# Patient Record
Sex: FEMALE | Race: WHITE | Employment: OTHER | URBAN - METROPOLITAN AREA
[De-identification: names, ages, dates, MRNs, and addresses within clinical notes are randomized per-mention and may not be internally consistent; named-entity substitution may affect disease eponyms.]

---

## 2021-08-03 ENCOUNTER — OFFICE VISIT (OUTPATIENT)
Dept: PULMONOLOGY | Age: 81
End: 2021-08-03
Payer: MEDICARE

## 2021-08-03 VITALS
BODY MASS INDEX: 48.01 KG/M2 | HEART RATE: 58 BPM | OXYGEN SATURATION: 97 % | TEMPERATURE: 98 F | RESPIRATION RATE: 16 BRPM | SYSTOLIC BLOOD PRESSURE: 130 MMHG | HEIGHT: 63 IN | DIASTOLIC BLOOD PRESSURE: 77 MMHG

## 2021-08-03 DIAGNOSIS — J44.9 CHRONIC OBSTRUCTIVE PULMONARY DISEASE, UNSPECIFIED COPD TYPE (HCC): ICD-10-CM

## 2021-08-03 DIAGNOSIS — J96.12 CHRONIC RESPIRATORY FAILURE WITH HYPERCAPNIA (HCC): Primary | ICD-10-CM

## 2021-08-03 DIAGNOSIS — Z99.89 DEPENDENCE ON ENABLING MACHINE: ICD-10-CM

## 2021-08-03 DIAGNOSIS — I50.32 DIASTOLIC CHF, CHRONIC (HCC): ICD-10-CM

## 2021-08-03 PROCEDURE — 99215 OFFICE O/P EST HI 40 MIN: CPT | Performed by: INTERNAL MEDICINE

## 2021-08-03 RX ORDER — OMEPRAZOLE 20 MG/1
20 CAPSULE, DELAYED RELEASE ORAL DAILY
COMMUNITY

## 2021-08-03 RX ORDER — TORSEMIDE 20 MG/1
20 TABLET ORAL DAILY
COMMUNITY

## 2021-08-03 RX ORDER — CHOLECALCIFEROL (VITAMIN D3) 1250 MCG
CAPSULE ORAL
COMMUNITY

## 2021-08-03 RX ORDER — LOPERAMIDE HYDROCHLORIDE 2 MG/1
2 CAPSULE ORAL 4 TIMES DAILY PRN
COMMUNITY

## 2021-08-03 RX ORDER — LANOLIN ALCOHOL/MO/W.PET/CERES
3 CREAM (GRAM) TOPICAL DAILY
COMMUNITY

## 2021-08-03 RX ORDER — FLUTICASONE PROPIONATE 110 UG/1
1 AEROSOL, METERED RESPIRATORY (INHALATION) 2 TIMES DAILY
COMMUNITY

## 2021-08-03 RX ORDER — SPIRONOLACTONE 25 MG/1
25 TABLET ORAL DAILY
COMMUNITY

## 2021-08-03 RX ORDER — BISACODYL 10 MG
10 SUPPOSITORY, RECTAL RECTAL DAILY
COMMUNITY

## 2021-08-03 RX ORDER — ROPINIROLE 0.5 MG/1
0.5 TABLET, FILM COATED ORAL NIGHTLY
COMMUNITY

## 2021-08-03 RX ORDER — DOCUSATE SODIUM 100 MG/1
100 CAPSULE, LIQUID FILLED ORAL DAILY
COMMUNITY

## 2021-08-03 RX ORDER — ONDANSETRON 4 MG/1
4 TABLET, FILM COATED ORAL EVERY 8 HOURS PRN
COMMUNITY

## 2021-08-03 RX ORDER — CYCLOSPORINE 0.5 MG/ML
1 EMULSION OPHTHALMIC 2 TIMES DAILY
COMMUNITY

## 2021-08-03 RX ORDER — GUAIFENESIN/DEXTROMETHORPHAN 100-10MG/5
5 SYRUP ORAL 3 TIMES DAILY PRN
COMMUNITY

## 2021-08-03 RX ORDER — ALBUTEROL SULFATE 90 UG/1
2 AEROSOL, METERED RESPIRATORY (INHALATION) EVERY 6 HOURS PRN
COMMUNITY

## 2021-08-03 ASSESSMENT — ENCOUNTER SYMPTOMS
EYES NEGATIVE: 1
RESPIRATORY NEGATIVE: 1
ALLERGIC/IMMUNOLOGIC NEGATIVE: 1
GASTROINTESTINAL NEGATIVE: 1

## 2021-08-03 NOTE — LETTER
Yes     PO2 90 - 110 mm HG 114High     BICARBONATE 21 - 29 mmol/L 34.3High     BASE EXCESS 0 - 2 mmol/L 3High     O2 SAT 92 - 96 % 97High   Comment: Tested at [address]            ABG done with BiPAP ST      Ref Range & Units 7d ago     PH 7.35 - 7.45 7. 371     PCO2 34 - 46 mm HG 53.4High       PO2 90 - 110 mm HG 99     BICARBONATE 21 - 29 mmol/L 30.2High       BASE EXCESS 0 - 2 mmol/L 4High       O2 SAT 92 - 96 % 97High     Comment: Tested at [address] [address]         ABG after AVAPS started      Ref Range & Units 6d ago     PH 7.35 - 7.45 7.445     PCO2 34 - 46 mm HG 51.6High       PO2 90 - 110 mm HG 90     BICARBONATE 21 - 29 mmol/L 34.7High       BASE EXCESS 0 - 2 mmol/L 9High       O2 SAT 92 - 96 % 97High     Comment: Tested at [address] [employer] [address] [address]     ORDER COMMENT   AVAPS 40% 97%                          Now at [address] [place] [address] is [facility]   Trilogy    AVAPS ST mode  avaps rate 1, tv of 500, ipap max 22 and min of 10, epap 8, rate 20, ti 1.5, rise 5, trig autotrak senstive,     Using 87.6 h/night  mv is 3.1 lpm  Pt trig 14%  Peak flow 11.9  Ave ipap 29.3  Ave epap of 8  tv of 153  Leak at 111.2    Using nasal mask  Patient is instructed to contact me if the ear popping gets worse    Will send order for supplies to [facility]      Will change the tidal volume to 550  Change epap min to 7 and max of 11  And get download for 1-2 weeks  Call me in 2 weeks about pressure change    Will ask blood work from the [facility] [place]  And see about the renal panel      RTC in 3 months                     Thank you for allowing me to assist in the care of the Doretha Ro MD

## 2021-08-03 NOTE — PROGRESS NOTES
Leslye Huang (:  1940) is a 79 y.o. female,Established patient, here for evaluation of the following chief complaint(s):  Follow-up (lungs former [facility] patient)         ASSESSMENT/PLAN:  1. Chronic respiratory failure with hypercapnia (HCC)  2. Dependence on enabling machine  3. Chronic obstructive pulmonary disease, unspecified COPD type ([address]  4. Diastolic CHF, chronic (HCC)        Patient will need noninvasive mechanical ventilation  She does not have any sleep apnea  PFTs remarkably are more consistent with an obstructive event some possibility of restrictive lung defect however will probably need a full PFT to determine this     Patient did not tolerate BiPAP and during the hospitalization and was worsened with increased CO2 level, also had BiPAP with ST and still had increased CO2 level     Ordering volume ventilator for nocturnal uses and daytime use as needed to reduce the risk of exacerbation.  Bipap insufficient due to severity of condition.   Absence of respiratory support will lead to death.        Patient has COPD and Chronic Respiratory Failure.  Patient will need targeted tidal volume which cannot be provided via a CPAP.  Bipap/Bilevel will not adequately ventilate this patient in order to complete ADLs,   Bipap/Bilevel will not lower the patient's CO2 levels, and this patient will require a set tidal volume.  Therefore Astral/Trilogy NIV is being ordered due to the severe state of this patient's disease. Without this therapy, the patient runs a higher risk of expiration.        ABG done after being on bipap for 1 month and coming to the hospital       7.35 - 7.45 7.197Low Panic     Comment: *CRITICAL VALUE*   Successful Call: PCO2,PH called 2020 10:14 AM to [facility] ([id number]/CONCHIS HALL) by [phone]. Read Back:  Yes     PCO2 34 - 46 mm HG 90.4High Panic   Comment: *CRITICAL VALUE*   Successful Call: PCO2,PH called 2020 10:14 AM to [facility] ([id number]/CONCHIS HALL) by [phone]. Read Back: Yes     PO2 90 - 110 mm HG 114High     BICARBONATE 21 - 29 mmol/L 34.3High     BASE EXCESS 0 - 2 mmol/L 3High     O2 SAT 92 - 96 % 97High   Comment: Tested at [address] [facility] [address]            ABG done with BiPAP ST      Ref Range & Units 7d ago     PH 7.35 - 7.45 7. 371     PCO2 34 - 46 mm HG 53.4High       PO2 90 - 110 mm HG 99     BICARBONATE 21 - 29 mmol/L 30.2High       BASE EXCESS 0 - 2 mmol/L 4High       O2 SAT 92 - 96 % 97High     Comment: Tested at [address] [employer] [address] [address]         ABG after AVAPS started      Ref Range & Units 6d ago     PH 7.35 - 7.45 7.445     PCO2 34 - 46 mm HG 51.6High       PO2 90 - 110 mm HG 90     BICARBONATE 21 - 29 mmol/L 34.7High       BASE EXCESS 0 - 2 mmol/L 9High       O2 SAT 92 - 96 % 97High     Comment: Tested at [address] [address]     ORDER COMMENT   AVAPS 40% 97%                          Now at [address] [place] [address] is [facility]   Trilogy    AVAPS ST mode  avaps rate 1, tv of 500, ipap max 22 and min of 10, epap 8, rate 20, ti 1.5, rise 5, trig autotrak senstive,     Using 87.6 h/night  mv is 3.1 lpm  Pt trig 14%  Peak flow 11.9  Ave ipap 29.3  Ave epap of 8  tv of 153  Leak at 111.2    Using nasal mask  Patient is instructed to contact me if the ear popping gets worse    Will send order for supplies to [facility]      Will change the tidal volume to 550  Change epap min to 7 and max of 11  And get download for 1-2 weeks  Call me in 2 weeks about pressure change    Will ask blood work from the [facility]  And see about the renal panel      RTC in 3 months      No follow-ups on file.            [address] [facility]  [address] [place] [address]  Dept: [phone]  Loc: [phone]     Diagnosis:  [] ZULLY (ICD-10: G47.33)  o CSA (ICD-10: G47.31)  [] Complex Sleep Apnea (ICD-10: G47. 40)  []  (ICD-10: G47.37)  [] Hypoxemia (ICD-10: R09.02)  [] COPD (J44.90)  [] Chronic Respiratory Failure with hypoxemia (J96.11)  [x] Chronicr Respiratory Failure with hypercapnia (J96.12)  [] Restrictive Lung Disease (J98.4)      [] New Rx (Device Preference: _________________________)     [x] Change Order  avaps AE     [] Replace ___________    [] Discontinue Order -  [] CPAP    [] BPAP    [] PAP    [] Oxygen   /   AMA?  [] Yes   [] No              Therapy    AHI: ________ BRIAN: ________  LOW SpO2: ________%      DME: [facility]    DEVICE / SETTINGS RAMP / COMFORT INTERFACE   []  CPAP () Pressure    Ramp: _________ min's  [] Ramp to patient preference General PAP Supply Kit  Medicaid does not cover heated tubing  ([employer])  PAP Tubing:  [] Heated ()- 1/3 mos                         [] Regular () - 1/3 mos  [] Disposable Water Chamber () - 1/6 mos  [] Disposable Filter () - 2/mo  [] Non-Disposable Filter () - 1/6 mos   []  BiLevel PAP ()           IPAP        []  BiLevel PAP with   ()       Backup Rate ()      EPAP Rate  [] Adjust FLEX to patient comfort       SUPPLEMENTAL OXYGEN  [] OXYGEN:      Liter/min: _________  [] Continuous        [] Nocturnal  [] Bleed into PAP Device      []  [facility] Kit    [] Mask interface () - 1/3 mos  [] Nasal Cushion () - 2/mo  [] Nasal Pillows ()  -2/mo  [] Headgear () - 1/6 mos   []  AutoBiLevel () Pressure  ()      Support           []  ResMed® IVAPS EPAP  [] Overnight Oximetry on Room Air  [] Overnight Oximetry on PAP Therapy    Target Pt Rate  Min PS      Target Va  Patient Ht  Ti Max                Ti Min        Rise time  Max PS  Trigger  Cycle  Epap  Epap max  Epap min  The patient has a history of:  [] Excessive Daytime Sleepiness  [] Insomnia  [] Impaired Cognition  [] Ischemic Heart Disease  [] Hypertension  [] Mood Disorders          [] History of Stroke  Additional Orders:______________________________________________________________________________________________________________________________________________________________________________     Full Face Mask Kit    [] Full face mask () - 1/3 mos  [] Full Face Cushion () - 1/mo  [] Headgear () - 1/6 mos                                           [] Respironics® ASV Advanced ()     EPAP Min  PS Min      EPAP Max  PS Max   Additional Supplies    [] Chin Strap ()  [] Heated Humidifier Kit ()  Mask Specifications:   [] Patient Preference      -or-            Brand:______________ Size:_______________   Type: __________________________________   [] Mask Refit:___________________________                                           Max Press  Ramp Time      Rate  Bi-flex: []1  []2  []3     [x] Respironics® AVAPS: ()     IPAP Min  IPAP Max  Pressure Max  Epap max  Epap min  Rise time 11        7             Avaps rate  EPAP   Additional Services    [] Annual PRN service and check of equipment  [] Routine service and check of equipment  [x] Download and report compliance data  2 weeks    Tidal volume 550     Tigger                                     Rate                                         Inspiratory time                                                         The following equipment is Medically Necessary for the above stated patient. It is reasonable and necessary in reference to acceptable standards of medical practice for this condition, and is not prescribed as a convenience. Frequency of Use:    Daily                 Length of Need: 13 Months              o The patient requires BiLevel PAP and the following apply: []  The patient requires a Respiratory Assist Device (RAD) and the following apply:   o CPAP was tried and failed to meet therapeutic goals.  [] CPAP was tried, but failed to meet therapeutic goals   o The prescribed mask interface has been properly fit, is the most comfortable to the patient and will be used with the BPAP device. [] The prescribed mask interface has been properly fit, is the most comfortable to the patient and will be used with the RAD.   o Current CPAP setting prevents patient from tolerating the therapy and lower CPAP settings fail to adequately control the symptoms of ZULLY, improve sleep quality, or reduce AHI to acceptable levels. [] Current CPAP setting prevent patient from tolerating the therapy and lower PAP settings fail to  adequately control ZULLY symptoms, improve sleep quality, or reduce AHI to acceptable levels.          [] There is significant improvement of the respiratory events on the RAD                                                                                                                                                                                                                                  Quang Mueller MD NPI- [id number]     [place]KA- [id number]                    08/03/21       ____________________________                        _______________________           Physician Signature                                                         Date                                                     Subjective   SUBJECTIVE/OBJECTIVE:  Pt was initially see by Dr. Angelo De Jesus in the hospital in 9/2020  But was placed on bipap    Reason for Consult: Cynthia Jeronimo is being seen for acute respiratory failure with hypoxia and hypercarbia, morbid obesity     History of Present Illness: Ms. Jeremias Hector is a 79year old  female with a medical history significant for former tobacco abuse for which she quit smoking in 1971, morbid obesity, and obstructive sleep apnea for which she uses BiPAP who initially presented to the hospital with complaints of increasing shortness of breath associated with productive cough with brown sputum for the past month as well as increasing edema in her lower extremities. She was also noted to have bradycardia with atrial fibrillation. Venous blood gas was obtained in the emergency department noted to be 64. Yesterday evening, a stroke alert alert was called as she was having decreased level of interaction and bilateral arm weakness. ABG was ordered at that time, however it was not completed until this morning. ABG this morning was noted to have a CO2 of 90 with a pH of 7.19. She was placed on BiPAP. BiPAP settings were changed and ABG was re-collected demonstrating a pH of 7.20 with a CO2 of 97. She is emergently being placed on AVAPS. She is opening her eyes, but moaning and unable to follow commands. Her sister is at bedside. Her sister reports that she has been wearing her BiPAP more at the facility as she has been more short of breath. Initial chest x-ray demonstrated left basilar and perihilar consolidation with air space disease. BNP was elevated at 183. Pro-calcitonin was negative. UTI noted on urinalysis. She is on Flovent at the facility.      Her sister reports that she resides at [address] [place] [address]. The patient's sister is going to call the patient's son and clarify her CODE STATUS.          Patient will need noninvasive mechanical ventilation  She does not have any sleep apnea  PFTs remarkably are more consistent with an obstructive event some possibility of restrictive lung defect however will probably need a full PFT to determine this     Patient did not tolerate BiPAP and during the hospitalization and was worsened with increased CO2 level, also had BiPAP with ST and still had increased CO2 level     Ordering volume ventilator for nocturnal uses and daytime use as needed to reduce the risk of exacerbation.  Bipap insufficient due to severity of condition.   Absence of respiratory support will lead to death.        Patient has COPD and Chronic Respiratory Failure.  Patient will need targeted tidal volume which cannot be provided via a CPAP.  Bipap/Bilevel will not adequately ventilate this patient in order to complete ADLs,   Bipap/Bilevel will not lower the patient's CO2 levels, and this patient will require a set tidal volume.  Therefore Astral/Trilogy NIV is being ordered due to the severe state of this patient's disease. Without this therapy, the patient runs a higher risk of expiration.        ABG done after being on bipap for 1 month and coming to the hospital         7.35 - 7.45 7.197Low Panic    Comment: *CRITICAL VALUE*   Successful Call: PCO2,PH called 09/02/2020 10:14 AM to [employer] ([id number]/ISABEL,CONCHIS) by [phone]. Read Back: Yes     PCO2 34 - 46 mm HG 90.4High Panic   Comment: *CRITICAL VALUE*   Successful Call: PCO2,PH called 09/02/2020 10:14 AM to [employer] ([id number]/ISABEL,CONCHIS) by [phone]. Read Back: Yes     PO2 90 - 110 mm HG 114High     BICARBONATE 21 - 29 mmol/L 34.3High     BASE EXCESS 0 - 2 mmol/L 3High     O2 SAT 92 - 96 % 97High   Comment: Tested at [address]            ABG done with BiPAP ST        Ref Range & Units 7d ago    PH 7.35 - 7.45 7. 371     PCO2 34 - 46 mm HG 53.4High      PO2 90 - 110 mm HG 99     BICARBONATE 21 - 29 mmol/L 30.2High      BASE EXCESS 0 - 2 mmol/L 4High      O2 SAT 92 - 96 % 97High    Comment: Tested at [address] [address]        ABG after AVAPS started        Ref Range & Units 6d ago    PH 7.35 - 7.45 7.445     PCO2 34 - 46 mm HG 51.6High      PO2 90 - 110 mm HG 90     BICARBONATE 21 - 29 mmol/L 34.7High      BASE EXCESS 0 - 2 mmol/L 9High      O2 SAT 92 - 96 % 97High    Comment: Tested at [address] [address]    ORDER COMMENT   AVAPS 40% 97%                     Cally Crzu MD        Please note that some or all of this record was generated using voice recognition software.  If there are any questions about the content of this document, please contact the author as some errors of transcription may have occurred.       Patient is tolerating noninvasive mechanical ventilation  No issues    Occasionally having some ear popping but this is very rarely    No bloating  No burping  No chest pains  No headaches  Tolerating the pressure  No nausea or vomiting  No ear popping    Going and sleeping well with the machine          Some dry mouth    No ear popping  No chest pain    Feels good  Sleeping better    Having more leak  And feels like blowing everywhere    No c      Review of Systems   Constitutional: Negative. HENT: Negative. Eyes: Negative. Respiratory: Negative. Cardiovascular: Negative. Gastrointestinal: Negative. Endocrine: Negative. Genitourinary: Negative. Musculoskeletal: Negative. Skin: Negative. Allergic/Immunologic: Negative. Neurological: Negative. Hematological: Negative. Psychiatric/Behavioral: Negative. Objective   Physical Exam  Vitals and nursing note reviewed. Constitutional:       General: She is not in acute distress. Appearance: Normal appearance. She is not ill-appearing. HENT:      Head: Normocephalic and atraumatic. Right Ear: External ear normal.      Left Ear: External ear normal.      Nose: Nose normal.      Mouth/Throat:      Mouth: Mucous membranes are moist.      Pharynx: Oropharynx is clear. Comments: Mallampati 3  Eyes:      General: No scleral icterus. Extraocular Movements: Extraocular movements intact. Conjunctiva/sclera: Conjunctivae normal.      Pupils: Pupils are equal, round, and reactive to light. Cardiovascular:      Rate and Rhythm: Normal rate and regular rhythm. Pulses: Normal pulses. Heart sounds: Normal heart sounds. No murmur heard. No friction rub. Pulmonary:      Effort: No respiratory distress. Breath sounds: No stridor. No wheezing, rhonchi or rales. Chest:      Chest wall: No tenderness. Abdominal:      General: Abdomen is flat.  Bowel sounds are normal. There is no distension. Tenderness: There is no abdominal tenderness. There is no guarding. Musculoskeletal:         General: Swelling present. No tenderness. Normal range of motion. Cervical back: Normal range of motion and neck supple. No rigidity. Skin:     General: Skin is warm and dry. Coloration: Skin is not jaundiced. Neurological:      General: No focal deficit present. Mental Status: She is alert and oriented to person, place, and time. Mental status is at baseline. Cranial Nerves: No cranial nerve deficit. Sensory: No sensory deficit. Motor: No weakness. Gait: Gait normal.   Psychiatric:         Mood and Affect: Mood normal.         Thought Content: Thought content normal.         Judgment: Judgment normal.            On this date 8/3/2021 I have spent 35 minutes reviewing previous notes, test results and face to face with the patient discussing the diagnosis and importance of compliance with the treatment plan as well as documenting on the day of the visit.       An electronic signature was used to authenticate this note.    --Bev Mckeon MD

## 2021-08-03 NOTE — PATIENT INSTRUCTIONS
ASSESSMENT/PLAN:  1. Chronic respiratory failure with hypercapnia (HCC)  2. Dependence on enabling machine  3. Chronic obstructive pulmonary disease, unspecified COPD type ([address]  4. Diastolic CHF, chronic (HCC)        Patient will need noninvasive mechanical ventilation  She does not have any sleep apnea  PFTs remarkably are more consistent with an obstructive event some possibility of restrictive lung defect however will probably need a full PFT to determine this     Patient did not tolerate BiPAP and during the hospitalization and was worsened with increased CO2 level, also had BiPAP with ST and still had increased CO2 level     Ordering volume ventilator for nocturnal uses and daytime use as needed to reduce the risk of exacerbation.  Bipap insufficient due to severity of condition.   Absence of respiratory support will lead to death.        Patient has COPD and Chronic Respiratory Failure.  Patient will need targeted tidal volume which cannot be provided via a CPAP.  Bipap/Bilevel will not adequately ventilate this patient in order to complete ADLs,   Bipap/Bilevel will not lower the patient's CO2 levels, and this patient will require a set tidal volume.  Therefore Astral/Trilogy NIV is being ordered due to the severe state of this patient's disease. Without this therapy, the patient runs a higher risk of expiration.        ABG done after being on bipap for 1 month and coming to the hospital       7.35 - 7.45 7.197Low Panic     Comment: *CRITICAL VALUE*   Successful Call: PCO2,PH called 09/02/2020 10:14 AM to [employer] ([employer]/ISABEL,CONCHIS) by [phone]. Read Back: Yes     PCO2 34 - 46 mm HG 90.4High Panic   Comment: *CRITICAL VALUE*   Successful Call: PCO2,PH called 09/02/2020 10:14 AM to [employer] ([id number]/ISABEL,CONCHIS) by [phone]. Read Back:  Yes     PO2 90 - 110 mm HG 114High     BICARBONATE 21 - 29 mmol/L 34.3High     BASE EXCESS 0 - 2 mmol/L 3High     O2 SAT 92 - 96 % 97High   Comment: [facility] [employer] [address]            ABG done with BiPAP ST      Ref Range & Units 7d ago     PH 7.35 - 7.45 7. 371     PCO2 34 - 46 mm HG 53.4High       PO2 90 - 110 mm HG 99     BICARBONATE 21 - 29 mmol/L 30.2High       BASE EXCESS 0 - 2 mmol/L 4High       O2 SAT 92 - 96 % 97High     Comment: Tested at [address] [facility] [address]         ABG after AVAPS started      Ref Range & Units 6d ago     PH 7.35 - 7.45 7.445     PCO2 34 - 46 mm HG 51.6High       PO2 90 - 110 mm HG 90     BICARBONATE 21 - 29 mmol/L 34.7High       BASE EXCESS 0 - 2 mmol/L 9High       O2 SAT 92 - 96 % 97High     Comment: Tested at [address] [place] [id number]     ORDER COMMENT   AVAPS 40% 97%                          Now at [facility] is [facility]   Trilogy    AVAPS ST mode  avaps rate 1, tv of 500, ipap max 22 and min of 10, epap 8, rate 20, ti 1.5, rise 5, trig autotrak senstive,     Using 87.6 h/night  mv is 3.1 lpm  Pt trig 14%  Peak flow 11.9  Ave ipap 29.3  Ave epap of 8  tv of 153  Leak at 111.2    Using nasal mask  Patient is instructed to contact me if the ear popping gets worse    Will send order for supplies to [facility]      Will change the tidal volume to 550  Change epap min to 7 and max of 11  And get download for 1-2 weeks  Call me in 2 weeks about pressure change    Will ask blood work from the [facility]  And see about the renal panel      RTC in 3 months    Remember to bring a list of pulmonary medications and any CPAP or BiPAP machines to your next appointment with the office. Please keep all of your future appointments scheduled by [address], [facility]. Out of respect for other patients and providers, you may be asked to reschedule your appointment if you arrive later than your scheduled appointment time. Appointments cancelled less than 24hrs in advance will be considered a no show.  Patients with three missed appointments within 1 year or four missed appointments within 2 years can be dismissed from the practice. Please be aware that our physicians are required to work in the Intensive Care Unit at [facility].  Your appointment may need to be rescheduled if they are designated to work during your appointment time. You may receive a survey regarding the care you received during your visit. Your input is valuable to us. We encourage you to complete and return your survey. We hope you will choose us in the future for your healthcare needs. Pt instructed of all future appointment dates & times, including radiology, labs, procedures & referrals. If procedures were scheduled preparation instructions provided. Instructions on future appointments with [facility] were given.

## 2021-08-03 NOTE — PROGRESS NOTES
MA Communication:   The following orders are received by verbal communication from Aracelis Davidson MD    Orders include:  Orders faxed to [facility]       3 mo fu scheduled 11/2/21

## 2021-11-02 ENCOUNTER — OFFICE VISIT (OUTPATIENT)
Dept: PULMONOLOGY | Age: 81
End: 2021-11-02
Payer: MEDICARE

## 2021-11-02 VITALS
RESPIRATION RATE: 16 BRPM | BODY MASS INDEX: 51.91 KG/M2 | SYSTOLIC BLOOD PRESSURE: 130 MMHG | DIASTOLIC BLOOD PRESSURE: 69 MMHG | OXYGEN SATURATION: 93 % | WEIGHT: 293 LBS | HEART RATE: 54 BPM | TEMPERATURE: 97.1 F | HEIGHT: 63 IN

## 2021-11-02 DIAGNOSIS — J44.9 CHRONIC OBSTRUCTIVE PULMONARY DISEASE, UNSPECIFIED COPD TYPE (HCC): ICD-10-CM

## 2021-11-02 DIAGNOSIS — J32.9 SINUSITIS, UNSPECIFIED CHRONICITY, UNSPECIFIED LOCATION: ICD-10-CM

## 2021-11-02 DIAGNOSIS — Z99.89 DEPENDENCE ON ENABLING MACHINE: ICD-10-CM

## 2021-11-02 DIAGNOSIS — J96.12 CHRONIC RESPIRATORY FAILURE WITH HYPERCAPNIA (HCC): Primary | ICD-10-CM

## 2021-11-02 DIAGNOSIS — I50.32 DIASTOLIC CHF, CHRONIC (HCC): ICD-10-CM

## 2021-11-02 PROCEDURE — G8427 DOCREV CUR MEDS BY ELIG CLIN: HCPCS | Performed by: INTERNAL MEDICINE

## 2021-11-02 PROCEDURE — 1036F TOBACCO NON-USER: CPT | Performed by: INTERNAL MEDICINE

## 2021-11-02 PROCEDURE — 1090F PRES/ABSN URINE INCON ASSESS: CPT | Performed by: INTERNAL MEDICINE

## 2021-11-02 PROCEDURE — 4040F PNEUMOC VAC/ADMIN/RCVD: CPT | Performed by: INTERNAL MEDICINE

## 2021-11-02 PROCEDURE — 3023F SPIROM DOC REV: CPT | Performed by: INTERNAL MEDICINE

## 2021-11-02 PROCEDURE — G8419 CALC BMI OUT NRM PARAM NOF/U: HCPCS | Performed by: INTERNAL MEDICINE

## 2021-11-02 PROCEDURE — 99215 OFFICE O/P EST HI 40 MIN: CPT | Performed by: INTERNAL MEDICINE

## 2021-11-02 PROCEDURE — G8400 PT W/DXA NO RESULTS DOC: HCPCS | Performed by: INTERNAL MEDICINE

## 2021-11-02 PROCEDURE — G8926 SPIRO NO PERF OR DOC: HCPCS | Performed by: INTERNAL MEDICINE

## 2021-11-02 PROCEDURE — 1123F ACP DISCUSS/DSCN MKR DOCD: CPT | Performed by: INTERNAL MEDICINE

## 2021-11-02 PROCEDURE — G8484 FLU IMMUNIZE NO ADMIN: HCPCS | Performed by: INTERNAL MEDICINE

## 2021-11-02 ASSESSMENT — ENCOUNTER SYMPTOMS
GASTROINTESTINAL NEGATIVE: 1
ALLERGIC/IMMUNOLOGIC NEGATIVE: 1
EYES NEGATIVE: 1
RESPIRATORY NEGATIVE: 1

## 2021-11-02 ASSESSMENT — COPD QUESTIONNAIRES: COPD: 1

## 2021-11-02 NOTE — PROGRESS NOTES
MA Communication:   The following orders are received by verbal communication from Susan Valdez MD    Orders include:  Orders faxed to [facility]       3 mo fu scheduled 2/8/22

## 2021-11-02 NOTE — PROGRESS NOTES
Isa Ashley (:  1940) is a 79 y.o. female,Established patient, here for evaluation of the following chief complaint(s):  COPD         ASSESSMENT/PLAN:  1. Chronic respiratory failure with hypercapnia (HCC)  2. Dependence on enabling machine  3. Chronic obstructive pulmonary disease, unspecified COPD type ([address]  4. Diastolic CHF, chronic ([address]  5. Sinusitis, unspecified chronicity, unspecified location        Patient will need noninvasive mechanical ventilation  She does not have any sleep apnea  PFTs remarkably are more consistent with an obstructive event some possibility of restrictive lung defect however will probably need a full PFT to determine this     Patient did not tolerate BiPAP and during the hospitalization and was worsened with increased CO2 level, also had BiPAP with ST and still had increased CO2 level     Ordering volume ventilator for nocturnal uses and daytime use as needed to reduce the risk of exacerbation.  Bipap insufficient due to severity of condition.   Absence of respiratory support will lead to death.        Patient has COPD and Chronic Respiratory Failure.  Patient will need targeted tidal volume which cannot be provided via a CPAP.  Bipap/Bilevel will not adequately ventilate this patient in order to complete ADLs,   Bipap/Bilevel will not lower the patient's CO2 levels, and this patient will require a set tidal volume.  Therefore Astral/Trilogy NIV is being ordered due to the severe state of this patient's disease. Without this therapy, the patient runs a higher risk of expiration.        ABG done after being on bipap for 1 month and coming to the hospital       7.35 - 7.45 7.197Low Panic     Comment: *CRITICAL VALUE*   Successful Call: PCO2,PH called 2020 10:14 AM to [employer] ([id number]/CONCHIS HALL) by [id number]. Read Back:  Yes     PCO2 34 - 46 mm HG 90.4High Panic   Comment: *CRITICAL VALUE*   Successful Call: PCO2,PH called 2020 10:14 AM to [employer] ([phone]) by [phone]. Read Back: Yes     PO2 90 - 110 mm HG 114High     BICARBONATE 21 - 29 mmol/L 34.3High     BASE EXCESS 0 - 2 mmol/L 3High     O2 SAT 92 - 96 % 97High   Comment: Tested at [facility] [address]            ABG done with BiPAP ST      Ref Range & Units 7d ago     PH 7.35 - 7.45 7. 371     PCO2 34 - 46 mm HG 53.4High       PO2 90 - 110 mm HG 99     BICARBONATE 21 - 29 mmol/L 30.2High       BASE EXCESS 0 - 2 mmol/L 4High       O2 SAT 92 - 96 % 97High     Comment: Tested at [facility] [address] [id number]         ABG after AVAPS started      Ref Range & Units 6d ago     PH 7.35 - 7.45 7.445     PCO2 34 - 46 mm HG 51.6High       PO2 90 - 110 mm HG 90     BICARBONATE 21 - 29 mmol/L 34.7High       BASE EXCESS 0 - 2 mmol/L 9High       O2 SAT 92 - 96 % 97High     Comment: Tested at [facility] [address] [id number]     ORDER COMMENT   AVAPS 40% 97%            Now at [facility]      dme is [facility]   Trilogy    AVAPS ST mode  avaps rate 1, tv of 550, ipap max 22 and min of 10, epap min 7 and max of 11, rate 20, ti 1.5, rise 5, trig autotrak senstive,     Download done 10/27/2021  Average IPAP 30  Average EPAP 10.5  Average breaths per minute 20  Average tidal volume 228  Average minute ventilation 4.5  100% use  Using 8.9 hours per night  Leak at 94 L/min      Using nasal mask  Patient is instructed to contact me if the ear popping gets worse    Will send order for supplies to [facility]        Will ask blood work from the [facility]- still not seen  And see about the renal panel      Sinusitis  Would suggest  flonase 1spray/nostril twice a day      RTC in 3 months      No follow-ups on file.            [address] [facility]  [address]  [facility] [address]  Dept: [phone]  Loc: [phone]     Diagnosis:  [] ZULLY (ICD-10: G47.33)  o CSA (ICD-10: G47.31)  [] Complex Sleep Apnea (ICD-10: G47.37)  []  (ICD-10: G47.37)  [] Hypoxemia (ICD-10: R09.02)  [] COPD (J44.90)  [] Chronic Respiratory Failure with hypoxemia (J96.11)  [x] Chronicr Respiratory Failure with hypercapnia (J96.12)  [] Restrictive Lung Disease (J98.4)      [] New Rx (Device Preference: _________________________)     [x] Change Order  avaps AE, will need to change      [] Replace ___________    [] Discontinue Order -  [] CPAP    [] BPAP    [] PAP    [] Oxygen   /   AMA?  [] Yes   [] No              Therapy    AHI: ________ BRIAN: ________  LOW SpO2: ________%      DME: [facility]    DEVICE / SETTINGS RAMP / COMFORT INTERFACE   []  CPAP () Pressure    Ramp: _________ min's  [] Ramp to patient preference General PAP Supply Kit  Medicaid does not cover heated tubing  ([employer])  PAP Tubing:  [] Heated ()- 1/3 mos                         [] Regular () - 1/3 mos  [x] Disposable Water Chamber () - 1/6 mos  [x] Disposable Filter () - 2/mo  [x] Non-Disposable Filter () - 1/6 mos   []  BiLevel PAP ()           IPAP        []  BiLevel PAP with   ()       Backup Rate ()      EPAP Rate  [] Adjust FLEX to patient comfort       SUPPLEMENTAL OXYGEN  [] OXYGEN:      Liter/min: _________  [] Continuous        [] Nocturnal  [] Bleed into PAP Device      []  [address] Kit    [x] Mask interface () - 1/3 mos  [x] Nasal Cushion () - 2/mo  [x] Nasal Pillows ()  -2/mo  [x] Headgear () - 1/6 mos   []  AutoBiLevel () Pressure  ()      Support           []  ResMed® IVAPS EPAP  [] Overnight Oximetry on Room Air  [] Overnight Oximetry on PAP Therapy    Target Pt Rate  Min PS      Target Va  Patient Ht  Ti Max                Ti Min        Rise time  Max PS  Trigger  Cycle  Epap  Epap max  Epap min  The patient has a history of:  [] Excessive Daytime Sleepiness  [] Insomnia  [] Impaired Cognition  [] Ischemic Heart Disease  [] Hypertension  [] Mood Disorders          [] History of Stroke  Additional Orders:______________________________________will need download after 2 weeks of the change below______________________________________________________________________________________________________     Full Face Mask Kit    [] Full face mask () - 1/3 mos  [] Full Face Cushion () - 1/mo  [] Headgear () - 1/6 mos                                           [] Respironics® ASV Advanced ()     EPAP Min  PS Min      EPAP Max  PS Max   Additional Supplies    [] Chin Strap ()  [] Heated Humidifier Kit ()  Mask Specifications:   [] Patient Preference      -or-            Brand:______________ Size:_______________   Type: __________________________________   [] Mask Refit:___________________________                                           Max Press  Ramp Time      Rate  Bi-flex: []1  []2  []3     [x] Respironics® AVAPS: ()     IPAP Min  IPAP Max  Pressure Max  Epap max  Epap min  Rise time 12      30  9             Avaps rate  EPAP   Additional Services    [] Annual PRN service and check of equipment  [] Routine service and check of equipment  [x] Download and report compliance data  2 weeks    Tidal volume 550     Tigger                                     Rate                                         Inspiratory time                                                         The following equipment is Medically Necessary for the above stated patient. It is reasonable and necessary in reference to acceptable standards of medical practice for this condition, and is not prescribed as a convenience.            Frequency of Use:    Daily                 Length of Need: 13 Months              o The patient requires BiLevel PAP and the following apply: []  The patient requires a Respiratory Assist Device (RAD) and the following apply:   o CPAP was tried and failed to meet therapeutic goals. [] CPAP was tried, but failed to meet therapeutic goals   o The prescribed mask interface has been properly fit, is the most comfortable to the patient and will be used with the BPAP device. [] The prescribed mask interface has been properly fit, is the most comfortable to the patient and will be used with the RAD.   o Current CPAP setting prevents patient from tolerating the therapy and lower CPAP settings fail to adequately control the symptoms of ZULLY, improve sleep quality, or reduce AHI to acceptable levels. [] Current CPAP setting prevent patient from tolerating the therapy and lower PAP settings fail to  adequately control ZULLY symptoms, improve sleep quality, or reduce AHI to acceptable levels.          [] There is significant improvement of the respiratory events on the RAD                                                                                                                                                                                                                                  Bessie Montero MD               NPI- [id number]     KOKA- [phone]                    11/02/21       ____________________________                        _______________________           Physician Signature                                                         Date                                                     Subjective   SUBJECTIVE/OBJECTIVE:  Pt was initially see by Dr. Suzette Encarnacion in the hospital in 9/2020  But was placed on bipap    Reason for Consult: Tha Abdullahi is being seen for acute respiratory failure with hypoxia and hypercarbia, morbid obesity     History of Present Illness: Ms. Sanjay Hanley is a 79year old  female with a medical history significant for former tobacco abuse for which she quit smoking in 1971, morbid obesity, and obstructive sleep apnea for which she uses BiPAP who initially presented to the hospital with complaints of increasing shortness of breath associated with productive cough with brown sputum for the past month as well as increasing edema in her lower extremities. She was also noted to have bradycardia with atrial fibrillation. Venous blood gas was obtained in the emergency department noted to be 64. Yesterday evening, a stroke alert alert was called as she was having decreased level of interaction and bilateral arm weakness. ABG was ordered at that time, however it was not completed until this morning. ABG this morning was noted to have a CO2 of 90 with a pH of 7.19. She was placed on BiPAP. BiPAP settings were changed and ABG was re-collected demonstrating a pH of 7.20 with a CO2 of 97. She is emergently being placed on AVAPS. She is opening her eyes, but moaning and unable to follow commands. Her sister is at bedside. Her sister reports that she has been wearing her BiPAP more at the facility as she has been more short of breath. Initial chest x-ray demonstrated left basilar and perihilar consolidation with air space disease. BNP was elevated at 183. Pro-calcitonin was negative. UTI noted on urinalysis. She is on Flovent at the facility.      Her sister reports that she resides at [address] [place] [address].  The patient's sister is going to call the patient's son and clarify her CODE STATUS.          Patient will need noninvasive mechanical ventilation  She does not have any sleep apnea  PFTs remarkably are more consistent with an obstructive event some possibility of restrictive lung defect however will probably need a full PFT to determine this     Patient did not tolerate BiPAP and during the hospitalization and was worsened with increased CO2 level, also had BiPAP with ST and still had increased CO2 level     Ordering volume ventilator for nocturnal uses and daytime use as needed to reduce the risk of exacerbation.  Bipap insufficient due to severity of condition.   Absence of respiratory support will lead to death.        Patient has COPD and Chronic Respiratory Failure.  Patient will need targeted tidal volume which cannot be provided via a CPAP.  Bipap/Bilevel will not adequately ventilate this patient in order to complete ADLs,   Bipap/Bilevel will not lower the patient's CO2 levels, and this patient will require a set tidal volume.  Therefore Astral/Trilogy NIV is being ordered due to the severe state of this patient's disease. Without this therapy, the patient runs a higher risk of expiration.        ABG done after being on bipap for 1 month and coming to the hospital         7.35 - 7.45 7.197Low Panic    Comment: *CRITICAL VALUE*   Successful Call: PCO2,PH called 09/02/2020 10:14 AM to [employer] ([id number]/CONCHIS HALL) by [phone]. Read Back: Yes     PCO2 34 - 46 mm HG 90.4High Panic   Comment: *CRITICAL VALUE*   Successful Call: PCO2,PH called 09/02/2020 10:14 AM to [employer] ([id number]/ISABELCONCHIS) by [id number]. Read Back: Yes     PO2 90 - 110 mm HG 114High     BICARBONATE 21 - 29 mmol/L 34.3High     BASE EXCESS 0 - 2 mmol/L 3High     O2 SAT 92 - 96 % 97High   Comment: Tested at [address]            ABG done with BiPAP ST        Ref Range & Units 7d ago    PH 7.35 - 7.45 7. 371     PCO2 34 - 46 mm HG 53.4High      PO2 90 - 110 mm HG 99     BICARBONATE 21 - 29 mmol/L 30.2High      BASE EXCESS 0 - 2 mmol/L 4High      O2 SAT 92 - 96 % 97High    Comment: Tested at [address] [id number]        ABG after AVAPS started        Ref Range & Units 6d ago    PH 7.35 - 7.45 7.445     PCO2 34 - 46 mm HG 51.6High      PO2 90 - 110 mm HG 90     BICARBONATE 21 - 29 mmol/L 34.7High      BASE EXCESS 0 - 2 mmol/L 9High      O2 SAT 92 - 96 % 97High    Comment: Tested at [address] [id number]    ORDER COMMENT   AVAPS 40% 97%                     Elena Smith MD        Please note that some or all of this record was generated using voice recognition software.  If there are any questions about the content of this document, please contact the author as some errors of transcription may have occurred.       Patient is tolerating noninvasive mechanical ventilation  No issues    Occasionally having some ear popping but this is very rarely    No bloating  No burping  No chest pains  No headaches  Tolerating the pressure  No nausea or vomiting  No ear popping    Going and sleeping well with the machine          Some dry mouth    No ear popping  No chest pain    Feels good  Sleeping better    Having more leak  And feels like blowing everywhere          Still having some issues throat dryness is not better    Now with humidifer    COPD        Review of Systems   Constitutional: Negative. HENT: Negative. Eyes: Negative. Respiratory: Negative. Cardiovascular: Negative. Gastrointestinal: Negative. Endocrine: Negative. Genitourinary: Negative. Musculoskeletal: Negative. Skin: Negative. Allergic/Immunologic: Negative. Neurological: Negative. Hematological: Negative. Psychiatric/Behavioral: Negative. Objective   Physical Exam  Vitals and nursing note reviewed. Constitutional:       General: She is not in acute distress. Appearance: Normal appearance. She is not ill-appearing. HENT:      Head: Normocephalic and atraumatic. Right Ear: External ear normal.      Left Ear: External ear normal.      Nose: Nose normal.      Mouth/Throat:      Mouth: Mucous membranes are moist.      Pharynx: Oropharynx is clear. Comments: Mallampati 3  Eyes:      General: No scleral icterus. Extraocular Movements: Extraocular movements intact. Conjunctiva/sclera: Conjunctivae normal.      Pupils: Pupils are equal, round, and reactive to light. Cardiovascular:      Rate and Rhythm: Normal rate and regular rhythm. Pulses: Normal pulses. Heart sounds: Normal heart sounds. No murmur heard. No friction rub. Pulmonary:      Effort: No respiratory distress.       Breath sounds: No stridor. No wheezing, rhonchi or rales. Chest:      Chest wall: No tenderness. Abdominal:      General: Abdomen is flat. Bowel sounds are normal. There is no distension. Tenderness: There is no abdominal tenderness. There is no guarding. Musculoskeletal:         General: Swelling present. No tenderness. Normal range of motion. Cervical back: Normal range of motion and neck supple. No rigidity. Skin:     General: Skin is warm and dry. Coloration: Skin is not jaundiced. Neurological:      General: No focal deficit present. Mental Status: She is alert and oriented to person, place, and time. Mental status is at baseline. Cranial Nerves: No cranial nerve deficit. Sensory: No sensory deficit. Motor: No weakness. Gait: Gait normal.   Psychiatric:         Mood and Affect: Mood normal.         Thought Content: Thought content normal.         Judgment: Judgment normal.            On this date 8/3/2021 I have spent 35 minutes reviewing previous notes, test results and face to face with the patient discussing the diagnosis and importance of compliance with the treatment plan as well as documenting on the day of the visit.       An electronic signature was used to authenticate this note.    --Carolina Reid MD

## 2021-11-02 NOTE — LETTER
11/2/21        [facility]      I have seen this patient in the office today and wanted to communicate my findings and recommendations. Patient Instructions            ASSESSMENT/PLAN:  1. Chronic respiratory failure with hypercapnia (HCC)  2. Dependence on enabling machine  3. Chronic obstructive pulmonary disease, unspecified COPD type ([address]  4. Diastolic CHF, chronic ([address]  5. Sinusitis, unspecified chronicity, unspecified location        Patient will need noninvasive mechanical ventilation  She does not have any sleep apnea  PFTs remarkably are more consistent with an obstructive event some possibility of restrictive lung defect however will probably need a full PFT to determine this     Patient did not tolerate BiPAP and during the hospitalization and was worsened with increased CO2 level, also had BiPAP with ST and still had increased CO2 level     Ordering volume ventilator for nocturnal uses and daytime use as needed to reduce the risk of exacerbation.  Bipap insufficient due to severity of condition.   Absence of respiratory support will lead to death.        Patient has COPD and Chronic Respiratory Failure.  Patient will need targeted tidal volume which cannot be provided via a CPAP.  Bipap/Bilevel will not adequately ventilate this patient in order to complete ADLs,   Bipap/Bilevel will not lower the patient's CO2 levels, and this patient will require a set tidal volume.  Therefore Astral/Trilogy NIV is being ordered due to the severe state of this patient's disease. Without this therapy, the patient runs a higher risk of expiration.        ABG done after being on bipap for 1 month and coming to the hospital       7.35 - 7.45 7.197Low Panic     Comment: *CRITICAL VALUE*   Successful Call: PCO2,PH called 09/02/2020 10:14 AM to [facility] TE[facility] ([id number]/CONCHIS HALL) by [phone]. Read Back:  Yes     PCO2 34 - 46 mm HG 90.4High Panic   Comment: *CRITICAL VALUE*   Successful Call: PCO2,PH called 09/02/2020 10:14 AM to BN2T3 TELE ([id number]/CONCHIS HALL) by [id number]. Read Back: Yes     PO2 90 - 110 mm HG 114High     BICARBONATE 21 - 29 mmol/L 34.3High     BASE EXCESS 0 - 2 mmol/L 3High     O2 SAT 92 - 96 % 97High   Comment: Tested at [address]            ABG done with BiPAP ST      Ref Range & Units 7d ago     PH 7.35 - 7.45 7. 371     PCO2 34 - 46 mm HG 53.4High       PO2 90 - 110 mm HG 99     BICARBONATE 21 - 29 mmol/L 30.2High       BASE EXCESS 0 - 2 mmol/L 4High       O2 SAT 92 - 96 % 97High     Comment: Tested at [address] [address]         ABG after AVAPS started      Ref Range & Units 6d ago     PH 7.35 - 7.45 7.445     PCO2 34 - 46 mm HG 51.6High       PO2 90 - 110 mm HG 90     BICARBONATE 21 - 29 mmol/L 34.7High       BASE EXCESS 0 - 2 mmol/L 9High       O2 SAT 92 - 96 % 97High     Comment: Tested at [address] [address]     ORDER COMMENT   AVAPS 40% 97%            [facility]      dme is Advant[facility] Resp   Trilogy    AVAPS ST mode  avaps rate 1, tv of 550, ipap max 22 and min of 10, epap min 7 and max of 11, rate 20, ti 1.5, rise 5, trig autotrak senstive,     Download done 10/27/2021  Average IPAP 30  Average EPAP 10.5  Average breaths per minute 20  Average tidal volume 228  Average minute ventilation 4.5  100% use  Using 8.9 hours per night  Leak at 94 L/min      Using nasal mask  Patient is instructed to contact me if the ear popping gets worse    Will send order for supplies to [facility]        Will ask blood work from the [facility]- still not seen  And see about the renal panel      Sinusitis  Would suggest  flonase 1spray/nostril twice a day      RTC in 3 months      No follow-ups on file.            [address] [facility]  [address]  [place] [id number]  Dept: [phone]  Loc: [phone]     Diagnosis:  [] ZULLY (ICD-10: G47.33)  o CSA (ICD-10: G47.31)  [] Complex Sleep Apnea (ICD-10: G47.37)  []  (ICD-10: G47.37)  [] Hypoxemia (ICD-10: R09.02)  [] COPD (J44.90)  [] Chronic Respiratory Failure with hypoxemia (J96.11)  [x] Chronicr Respiratory Failure with hypercapnia (J96.12)  [] Restrictive Lung Disease (J98.4)      [] New Rx (Device Preference: _________________________)     [x] Change Order  avaps AE, will need to change      [] Replace ___________    [] Discontinue Order -  [] CPAP    [] BPAP    [] PAP    [] Oxygen   /   AMA?  [] Yes   [] No              Therapy    AHI: ________ BRIAN: ________  LOW SpO2: ________%      DME: [facility]    DEVICE / SETTINGS RAMP / COMFORT INTERFACE   []  CPAP () Pressure    Ramp: _________ min's  [] Ramp to patient preference General PAP Supply Kit  Medicaid does not cover heated tubing  ([employer])  PAP Tubing:  [] Heated ()- 1/3 mos                         [] Regular ()  1/3 mos  [x] Disposable Water Chamber () - 1/6 mos  [x] Disposable Filter () - 2/mo  [x] Non-Disposable Filter () - 1/6 mos   []  BiLevel PAP ()           IPAP        []  BiLevel PAP with   ()       Backup Rate ()      EPAP Rate  [] Adjust FLEX to patient comfort       SUPPLEMENTAL OXYGEN  [] OXYGEN:      Liter/min: _________  [] Continuous        [] Nocturnal  [] Bleed into PAP Device      []  [address] Kit    [x] Mask interface () - 1/3 mos  [x] Nasal Cushion ()  2/mo  [x] Nasal Pillows ()  -2/mo  [x] Headgear ()  1/6 mos   []  AutoBiLevel () Pressure  ()      Support           []  ResMed® IVAPS EPAP  [] Overnight Oximetry on Room Air  [] Overnight Oximetry on PAP Therapy    Target Pt Rate  Min PS      Target Va  Patient Ht  Ti Max                Ti Min        Rise time  Max PS  Trigger  Cycle  Epap  Epap max  Epap min  The patient has a history of:  [] Excessive Daytime Sleepiness  [] Insomnia  [] Impaired Cognition  [] Ischemic Heart Disease  [] Hypertension  [] Mood Disorders          [] History of Stroke  Additional Orders:______________________________________will need download after 2 weeks of the change below______________________________________________________________________________________________________     Full Face Mask Kit    [] Full face mask ()  1/3 mos  [] Full Face Cushion ()  1/mo  [] Headgear ()  1/6 mos                                           [] Respironics® ASV Advanced ()     EPAP Min  PS Min      EPAP Max  PS Max   Additional Supplies    [] Chin Strap ()  [] Heated Humidifier Kit ()  Mask Specifications:   [] Patient Preference      -or-            Brand:______________ Size:_______________   Type: __________________________________   [] Mask Refit:___________________________                                           Max Press  Ramp Time      Rate  Bi-flex: []1  []2  []3     [x] Respironics® AVAPS: ()     IPAP Min  IPAP Max  Pressure Max  Epap max  Epap min  Rise time 12      30  9             Avaps rate  EPAP   Additional Services    [] Annual PRN service and check of equipment  [] Routine service and check of equipment  [x] Download and report compliance data  2 weeks    Tidal volume 550     Tigger                                     Rate                                         Inspiratory time                                                         The following equipment is Medically Necessary for the above stated patient. It is reasonable and necessary in reference to acceptable standards of medical practice for this condition, and is not prescribed as a convenience.            Frequency of Use:    Daily                 Length of Need: 13 Months              o The patient requires BiLevel PAP and the following apply: []  The patient requires a Respiratory Assist Device (RAD) and the following apply:   o CPAP was tried and failed to meet therapeutic goals. [] CPAP was tried, but failed to meet therapeutic goals   o The prescribed mask interface has been properly fit, is the most comfortable to the patient and will be used with the BPAP device. [] The prescribed mask interface has been properly fit, is the most comfortable to the patient and will be used with the RAD.   o Current CPAP setting prevents patient from tolerating the therapy and lower CPAP settings fail to adequately control the symptoms of ZULLY, improve sleep quality, or reduce AHI to acceptable levels. [] Current CPAP setting prevent patient from tolerating the therapy and lower PAP settings fail to  adequately control ZULLY symptoms, improve sleep quality, or reduce AHI to acceptable levels.          [] There is significant improvement of the respiratory events on the RAD                                                                                                                                                                                                                                  Sumaya Ramey MD               NPI- [id number]     [facility] [phone]                    11/02/21       ____________________________                        _______________________           Physician Signature                                                         Date                                                                  Thank you for allowing me to assist in the [facility]

## 2021-11-02 NOTE — PATIENT INSTRUCTIONS
ASSESSMENT/PLAN:  1. Chronic respiratory failure with hypercapnia (HCC)  2. Dependence on enabling machine  3. Chronic obstructive pulmonary disease, unspecified COPD type ([address]  4. Diastolic CHF, chronic ([facility]  5. Sinusitis, unspecified chronicity, unspecified location        Patient will need noninvasive mechanical ventilation  She does not have any sleep apnea  PFTs remarkably are more consistent with an obstructive event some possibility of restrictive lung defect however will probably need a full PFT to determine this     Patient did not tolerate BiPAP and during the hospitalization and was worsened with increased CO2 level, also had BiPAP with ST and still had increased CO2 level     Ordering volume ventilator for nocturnal uses and daytime use as needed to reduce the risk of exacerbation.  Bipap insufficient due to severity of condition.   Absence of respiratory support will lead to death.        Patient has COPD and Chronic Respiratory Failure.  Patient will need targeted tidal volume which cannot be provided via a CPAP.  Bipap/Bilevel will not adequately ventilate this patient in order to complete ADLs,   Bipap/Bilevel will not lower the patient's CO2 levels, and this patient will require a set tidal volume.  Therefore Astral/Trilogy NIV is being ordered due to the severe state of this patient's disease. Without this therapy, the patient runs a higher risk of expiration.        ABG done after being on bipap for 1 month and coming to the hospital       7.35 - 7.45 7.197Low Panic     Comment: *CRITICAL VALUE*   Successful Call: PCO2,PH called 09/02/2020 10:14 AM to [email or web address]) by [phone]. Read Back: Yes     PCO2 34 - 46 mm HG 90.4High Panic   Comment: *CRITICAL VALUE*   Successful Call: PCO2,PH called 09/02/2020 10:14 AM to [employer] ([email or web address]) by [id number]. Read Back:  Yes     PO2 90 - 110 mm HG 114High     BICARBONATE 21 - 29 mmol/L 34.3High     BASE EXCESS 0 - 2 mmol/L 3High     O2 SAT 92 - 96 % 97High   Comment: Tested at [address]            ABG done with BiPAP ST      Ref Range & Units 7d ago     PH 7.35 - 7.45 7. 371     PCO2 34 - 46 mm HG 53.4High       PO2 90 - 110 mm HG 99     BICARBONATE 21 - 29 mmol/L 30.2High       BASE EXCESS 0 - 2 mmol/L 4High       O2 SAT 92 - 96 % 97High     Comment: Tested at [address] [id number]         ABG after AVAPS started      Ref Range & Units 6d ago     PH 7.35 - 7.45 7.445     PCO2 34 - 46 mm HG 51.6High       PO2 90 - 110 mm HG 90     BICARBONATE 21 - 29 mmol/L 34.7High       BASE EXCESS 0 - 2 mmol/L 9High       O2 SAT 92 - 96 % 97High     Comment: Tested at [address] [facility] [address] [id number]     ORDER COMMENT   AVAPS 40% 97%            Now at [facility]      dme is Advantage Resp   Trilogy    AVAPS ST mode  avaps rate 1, tv of 550, ipap max 22 and min of 10, epap min 7 and max of 11, rate 20, ti 1.5, rise 5, trig autotrak senstive,     Download done 10/27/2021  Average IPAP 30  Average EPAP 10.5  Average breaths per minute 20  Average tidal volume 228  Average minute ventilation 4.5  100% use  Using 8.9 hours per night  Leak at 94 L/min      Using nasal mask  Patient is instructed to contact me if the ear popping gets worse    Will send order for supplies to [facility]        Will ask [facility] work from the [facility]- still not seen  And see about the renal panel      Sinusitis  Would suggest  flonase 1spray/nostril twice a day      RTC in 3 months      No follow-ups on file.            [address] [facility]  [address]  [facility] [address]  Dept: [phone]  Loc: [phone]     Diagnosis:  [] ZULLY (ICD-10: G47.33)  o CSA (ICD-10: G47.31)  [] Complex Sleep Apnea (ICD-10: G47.37)  []  (ICD-10: G47.37)  [] Hypoxemia (ICD-10: R09.02)  [] COPD (J44.90)  [] Chronic Respiratory Failure with hypoxemia (J96.11)  [x] Chronicr Respiratory Failure with hypercapnia (J96.12)  [] Restrictive Lung Disease (J98.4)      [] New Rx (Device Preference: _________________________)     [x] Change Order  avaps AE, will need to change      [] Replace ___________    [] Discontinue Order -  [] CPAP    [] BPAP    [] PAP    [] Oxygen   /   AMA?  [] Yes   [] No              Therapy    AHI: ________ BRIAN: ________  LOW SpO2: ________%      DME: [facility]    DEVICE / SETTINGS RAMP / COMFORT INTERFACE   []  CPAP () Pressure    Ramp: _________ min's  [] Ramp to patient preference General PAP Supply Kit  Medicaid does not cover heated tubing  ([employer])  PAP Tubing:  [] Heated ()- 1/3 mos                         [] Regular () - 1/3 mos  [x] Disposable Water Chamber () - 1/6 mos  [x] Disposable Filter () - 2/mo  [x] Non-Disposable Filter () - 1/6 mos   []  BiLevel PAP ()           IPAP        []  BiLevel PAP with   ()       Backup Rate ()      EPAP Rate  [] Adjust FLEX to patient comfort       SUPPLEMENTAL OXYGEN  [] OXYGEN:      Liter/min: _________  [] Continuous        [] Nocturnal  [] Bleed into PAP Device      []  [address] Kit    [x] Mask interface () - 1/3 mos  [x] Nasal Cushion () - 2/mo  [x] Nasal Pillows ()  -2/mo  [x] Headgear () - 1/6 mos   []  AutoBiLevel () Pressure  ()      Support           []  ResMed® IVAPS EPAP  [] Overnight Oximetry on Room Air  [] Overnight Oximetry on PAP Therapy    Target Pt Rate  Min PS      Target Va  Patient Ht  Ti Max                Ti Min        Rise time  Max PS  Trigger  Cycle  Epap  Epap max  Epap min  The patient has a history of:  [] Excessive Daytime Sleepiness  [] Insomnia  [] Impaired Cognition  [] Ischemic Heart Disease  [] Hypertension  [] Mood Disorders          [] History of Stroke  Additional Orders:______________________________________will need download after 2 weeks of the change below______________________________________________________________________________________________________     Full Face Mask Kit    [] Full face mask () - 1/3 mos  [] Full Face Cushion () - 1/mo  [] Headgear () - 1/6 mos                                           [] Respironics® ASV Advanced ()     EPAP Min  PS Min      EPAP Max  PS Max   Additional Supplies    [] Chin Strap ()  [] Heated Humidifier Kit ()  Mask Specifications:   [] Patient Preference      -or-            Brand:______________ Size:_______________   Type: __________________________________   [] Mask Refit:___________________________                                           Max Press  Ramp Time      Rate  Bi-flex: []1  []2  []3     [x] Respironics® AVAPS: ()     IPAP Min  IPAP Max  Pressure Max  Epap max  Epap min  Rise time 12      30  9             Avaps rate  EPAP   Additional Services    [] Annual PRN service and check of equipment  [] Routine service and check of equipment  [x] Download and report compliance data  2 weeks    Tidal volume 550     Tigger                                     Rate                                         Inspiratory time                                                         The following equipment is Medically Necessary for the above stated patient. It is reasonable and necessary in reference to acceptable standards of medical practice for this condition, and is not prescribed as a convenience. Frequency of Use:    Daily                 Length of Need: 13 Months              o The patient requires BiLevel PAP and the following apply: []  The patient requires a Respiratory Assist Device (RAD) and the following apply:   o CPAP was tried and failed to meet therapeutic goals.  [] CPAP was tried, but failed to meet therapeutic goals   o The prescribed mask interface has been properly fit, is the most comfortable to the patient and will be used with the BPAP device. [] The prescribed mask interface has been properly fit, is the most comfortable to the patient and will be used with the RAD.   o Current CPAP setting prevents patient from tolerating the therapy and lower CPAP settings fail to adequately control the symptoms of ZULLY, improve sleep quality, or reduce AHI to acceptable levels. [] Current CPAP setting prevent patient from tolerating the therapy and lower PAP settings fail to  adequately control ZULLY symptoms, improve sleep quality, or reduce AHI to acceptable levels. [] There is significant improvement of the respiratory events on the RAD                                                                                                                                                                                                                                  Jose Brantley MD               NPI- [id number]     Rodrigo Fuller [phone]                    11/02/21       ____________________________                        _______________________           Physician Signature                                                         Date                                               Remember to bring a list of pulmonary medications and any CPAP or BiPAP machines to your next appointment with the office. Please keep all of your future appointments scheduled by [facility] [address], [facility] office. Out of respect for other patients and providers, you may be asked to reschedule your appointment if you arrive later than your scheduled appointment time. Appointments cancelled less than 24hrs in advance will be considered a no show. Patients with three missed appointments within 1 year or four missed appointments within 2 years can be dismissed from the practice.      Please be aware that our physicians are required to work in the Intensive Care Unit at [facility].  Your appointment may need to be rescheduled if they are designated to work during your appointment time. You may receive a survey regarding the care you received during your visit. Your input is valuable to us. We encourage you to complete and return your survey. We hope you will choose us in the future for your healthcare needs. Pt instructed of all future appointment dates & times, including radiology, labs, procedures & referrals. If procedures were scheduled preparation instructions provided. Instructions on future appointments with [facility] were given.

## 2022-09-01 ENCOUNTER — OFFICE VISIT (OUTPATIENT)
Dept: PULMONOLOGY | Age: 82
End: 2022-09-01
Payer: MEDICARE

## 2022-09-01 VITALS
SYSTOLIC BLOOD PRESSURE: 121 MMHG | OXYGEN SATURATION: 94 % | TEMPERATURE: 97.1 F | HEART RATE: 64 BPM | HEIGHT: 63 IN | RESPIRATION RATE: 16 BRPM | BODY MASS INDEX: 53.85 KG/M2 | DIASTOLIC BLOOD PRESSURE: 80 MMHG

## 2022-09-01 DIAGNOSIS — Z99.89 DEPENDENCE ON ENABLING MACHINE: ICD-10-CM

## 2022-09-01 DIAGNOSIS — I50.32 DIASTOLIC CHF, CHRONIC (HCC): ICD-10-CM

## 2022-09-01 DIAGNOSIS — J32.9 SINUSITIS, UNSPECIFIED CHRONICITY, UNSPECIFIED LOCATION: ICD-10-CM

## 2022-09-01 DIAGNOSIS — J44.9 CHRONIC OBSTRUCTIVE PULMONARY DISEASE, UNSPECIFIED COPD TYPE (HCC): ICD-10-CM

## 2022-09-01 DIAGNOSIS — J96.12 CHRONIC RESPIRATORY FAILURE WITH HYPERCAPNIA (HCC): Primary | ICD-10-CM

## 2022-09-01 PROCEDURE — 3023F SPIROM DOC REV: CPT | Performed by: INTERNAL MEDICINE

## 2022-09-01 PROCEDURE — G8400 PT W/DXA NO RESULTS DOC: HCPCS | Performed by: INTERNAL MEDICINE

## 2022-09-01 PROCEDURE — G8417 CALC BMI ABV UP PARAM F/U: HCPCS | Performed by: INTERNAL MEDICINE

## 2022-09-01 PROCEDURE — G8427 DOCREV CUR MEDS BY ELIG CLIN: HCPCS | Performed by: INTERNAL MEDICINE

## 2022-09-01 PROCEDURE — 1036F TOBACCO NON-USER: CPT | Performed by: INTERNAL MEDICINE

## 2022-09-01 PROCEDURE — 1123F ACP DISCUSS/DSCN MKR DOCD: CPT | Performed by: INTERNAL MEDICINE

## 2022-09-01 PROCEDURE — 1090F PRES/ABSN URINE INCON ASSESS: CPT | Performed by: INTERNAL MEDICINE

## 2022-09-01 PROCEDURE — 99215 OFFICE O/P EST HI 40 MIN: CPT | Performed by: INTERNAL MEDICINE

## 2022-09-01 RX ORDER — FLUTICASONE PROPIONATE 50 MCG
1 SPRAY, SUSPENSION (ML) NASAL DAILY
COMMUNITY

## 2022-09-01 ASSESSMENT — ENCOUNTER SYMPTOMS
GASTROINTESTINAL NEGATIVE: 1
RESPIRATORY NEGATIVE: 1
ALLERGIC/IMMUNOLOGIC NEGATIVE: 1
EYES NEGATIVE: 1

## 2022-09-01 ASSESSMENT — COPD QUESTIONNAIRES: COPD: 1

## 2022-09-01 NOTE — PROGRESS NOTES
MA Communication:   The following orders are received by verbal communication from Alan Correa MD    Orders include:  Order sent to [address] [place] [address]       3 mo fu scheduled 12/1/22

## 2022-09-01 NOTE — PROGRESS NOTES
Ab Narayan (:  1940) is a 79 y.o. female,Established patient, here for evaluation of the following chief complaint(s):  COPD         ASSESSMENT/PLAN:  1. Chronic respiratory failure with hypercapnia (HCC)  2. Dependence on enabling machine  3. Chronic obstructive pulmonary disease, unspecified COPD type ([address]  4. Diastolic CHF, chronic ([address]  5. Sinusitis, unspecified chronicity, unspecified location    This was started in 2020    Patient will need noninvasive mechanical ventilation  She does not have any sleep apnea  PFTs remarkably are more consistent with an obstructive event some possibility of restrictive lung defect however will probably need a full PFT to determine this     Patient did not tolerate BiPAP and during the hospitalization and was worsened with increased CO2 level, also had BiPAP with ST and still had increased CO2 level     Ordering volume ventilator for nocturnal uses and daytime use as needed to reduce the risk of exacerbation. Bipap insufficient due to severity of condition. Absence of respiratory support will lead to death. Patient has COPD and Chronic Respiratory Failure. Patient will need targeted tidal volume which cannot be provided via a CPAP. Bipap/Bilevel will not adequately ventilate this patient in order to complete ADLs,   Bipap/Bilevel will not lower the patient's CO2 levels, and this patient will require a set tidal volume. Therefore Astral/Trilogy NIV is being ordered due to the severe state of this patient's disease. Without this therapy, the patient runs a higher risk of expiration. ABG done after being on bipap for 1 month and coming to the hospital       7.35 - 7.45 7.197Low Panic     Comment: *CRITICAL VALUE*   Successful Call: PCO2,PH called 2020 10:14 AM to BN2T3 TELE (86210/CONCHIS HALL) by [id number].  Read Back: Yes     PCO2 34 - 46 mm HG 90.4High Panic   Comment: *CRITICAL VALUE*   Successful Call: PCO2,PH called 2020 10:14 AM to [address] ([address]/CONCHIS HALL) by [id number].  Read Back: Yes     PO2 90 - 110 mm HG 114High     BICARBONATE 21 - 29 mmol/L 34.3High     BASE EXCESS 0 - 2 mmol/L 3High     O2 SAT 92 - 96 % 97High   Comment: Tested at [address]            ABG done with BiPAP ST      Ref Range & Units 7d ago     PH 7.35 - 7.45 7.371     PCO2 34 - 46 mm HG 53.4High       PO2 90 - 110 mm HG 99     BICARBONATE 21 - 29 mmol/L 30.2High       BASE EXCESS 0 - 2 mmol/L 4High       O2 SAT 92 - 96 % 97High     Comment: Tested at [address] [address]         ABG after AVAPS started      Ref Range & Units 6d ago     PH 7.35 - 7.45 7.445     PCO2 34 - 46 mm HG 51.6High       PO2 90 - 110 mm HG 90     BICARBONATE 21 - 29 mmol/L 34.7High       BASE EXCESS 0 - 2 mmol/L 9High       O2 SAT 92 - 96 % 97High     Comment: Tested at [address] 40% 97%            Now at [facility]      dme is Advantage Resp   Trilogy    AVAPS ST mode  avaps rate 1, tv of 550, ipap max 22 and min of 10, epap min 7 and max of 11, rate 20, ti 1.5, rise 5, trig autotrak senstive, max pressure 30    Download done  8/26/22 for 30 days  Average IPAP 24  Average EPAP 10.3  Average breaths per minute 20  Average tidal volume 277  Average minute ventilation 5.6  100% use  Using 7.2hours per night  Leak at 109 L/min      I had asked that the max pressure to 30 the last time  We will reorder it again today to have the max pressure set to 40, and the pressures were max to 30    Using nasal mask  Patient is instructed to contact me if the ear popping gets worse    Will send order for supplies to [facility]      Last blood work done 7/20/22  Showed HCO3 was 27      Sinusitis  Continue with   flonase 1spray/nostril twice a day        Has not been the hospital since 9/2020 since getting the trilogy    RTC in 3 months      No follow-ups on file.           [address] [facility]  [address] [facility]  [address] [address]  Dept: [phone]  Loc: [phone]     Diagnosis:  [] ZULLY (ICD-10: G47.33)  o CSA (ICD-10: G47.31)  [] Complex Sleep Apnea (ICD-10: G47.37)  []  (ICD-10: G47.37)  [] Hypoxemia (ICD-10: R09.02)  [] COPD (J44.90)  [] Chronic Respiratory Failure with hypoxemia (J96.11)  [x] Chronicr Respiratory Failure with hypercapnia (J96.12)  [] Restrictive Lung Disease (J98.4)      [] New Rx (Device Preference: _________________________)     [x] Change Order  avaps AE, will need to change      [] Replace ___________    [] Discontinue Order -  [] CPAP    [] BPAP    [] PAP    [] Oxygen   /   AMA?  [] Yes   [] No              Therapy    AHI: ________ BRIAN: ________  LOW SpO2: ________%      DME: [facility]    DEVICE / SETTINGS RAMP / COMFORT INTERFACE   []  CPAP () Pressure    Ramp: _________ min's  [] Ramp to patient preference General PAP Supply Kit  Medicaid does not cover heated tubing  ([employer])  PAP Tubing:  [] Heated ()- 1/3 mos                         [] Regular () - 1/3 mos  [x] Disposable Water Chamber () - 1/6 mos  [x] Disposable Filter () - 2/mo  [x] Non-Disposable Filter () - 1/6 mos   []  BiLevel PAP ()           IPAP        []  BiLevel PAP with   ()       Backup Rate ()      EPAP Rate  [] Adjust FLEX to patient comfort       SUPPLEMENTAL OXYGEN  [] OXYGEN:      Liter/min: _________  [] Continuous        [] Nocturnal  [] Bleed into PAP Device      []  [address] Kit    [x] Mask interface () - 1/3 mos  [x] Nasal Cushion () - 2/mo  [x] Nasal Pillows ()  -2/mo  [x] Headgear () - 1/6 mos   []  AutoBiLevel () Pressure  ()      Support           []  ResMed® IVAPS EPAP  [] Overnight Oximetry on Room Air  [] convenience. Frequency of Use:    Daily                 Length of Need: 13 Months              o The patient requires BiLevel PAP and the following apply: []  The patient requires a Respiratory Assist Device (RAD) and the following apply:   o CPAP was tried and failed to meet therapeutic goals. [] CPAP was tried, but failed to meet therapeutic goals   o The prescribed mask interface has been properly fit, is the most comfortable to the patient and will be used with the BPAP device. [] The prescribed mask interface has been properly fit, is the most comfortable to the patient and will be used with the RAD.   o Current CPAP setting prevents patient from tolerating the therapy and lower CPAP settings fail to adequately control the symptoms of ZULLY, improve sleep quality, or reduce AHI to acceptable levels. [] Current CPAP setting prevent patient from tolerating the therapy and lower PAP settings fail to  adequately control ZULLY symptoms, improve sleep quality, or reduce AHI to acceptable levels.          [] There is significant improvement of the respiratory events on the RAD                                                                                                                                                                                                                                  Ruth Ann Guaman MD               NPI- [id number]     KOTKA- [phone]                    09/01/22       ____________________________                        _______________________           Physician Signature                                                         Date                                                     Subjective   SUBJECTIVE/OBJECTIVE:  Pt was initially see by Dr. Miri Durbin in the hospital in 9/2020  But was placed on bipap    Reason for Consult: Ignacio Baxter is being seen for acute respiratory failure with hypoxia and hypercarbia, morbid obesity     History of Present Illness: Ms. Gela Bueno is a 79 year old  female with a medical history significant for former tobacco abuse for which she quit smoking in 1971, morbid obesity, and obstructive sleep apnea for which she uses BiPAP who initially presented to the hospital with complaints of increasing shortness of breath associated with productive cough with brown sputum for the past month as well as increasing edema in her lower extremities. She was also noted to have bradycardia with atrial fibrillation. Venous blood gas was obtained in the emergency department noted to be 64. Yesterday evening, a stroke alert alert was called as she was having decreased level of interaction and bilateral arm weakness. ABG was ordered at that time, however it was not completed until this morning. ABG this morning was noted to have a CO2 of 90 with a pH of 7.19. She was placed on BiPAP. BiPAP settings were changed and ABG was re-collected demonstrating a pH of 7.20 with a CO2 of 97. She is emergently being placed on AVAPS. She is opening her eyes, but moaning and unable to follow commands. Her sister is at bedside. Her sister reports that she has been wearing her BiPAP more at the facility as she has been more short of breath. Initial chest x-ray demonstrated left basilar and perihilar consolidation with air space disease. BNP was elevated at 183. Pro-calcitonin was negative. UTI noted on urinalysis. She is on Flovent at the facility. Her sister reports that she resides at [address] [place] [address]. The patient's sister is going to call the patient's son and clarify her CODE STATUS.           Patient will need noninvasive mechanical ventilation  She does not have any sleep apnea  PFTs remarkably are more consistent with an obstructive event some possibility of restrictive lung defect however will probably need a full PFT to determine this     Patient did not tolerate BiPAP and during the hospitalization and was worsened with increased CO2 level, also had BiPAP with ST and still had increased CO2 level     Ordering volume ventilator for nocturnal uses and daytime use as needed to reduce the risk of exacerbation. Bipap insufficient due to severity of condition. Absence of respiratory support will lead to death. Patient has COPD and Chronic Respiratory Failure. Patient will need targeted tidal volume which cannot be provided via a CPAP. Bipap/Bilevel will not adequately ventilate this patient in order to complete ADLs,   Bipap/Bilevel will not lower the patient's CO2 levels, and this patient will require a set tidal volume. Therefore Astral/Trilogy NIV is being ordered due to the severe state of this patient's disease. Without this therapy, the patient runs a higher risk of expiration. ABG done after being on bipap for 1 month and coming to the hospital         7.35 - 7.45 7.197Low Panic    Comment: *CRITICAL VALUE*   Successful Call: PCO2,PH called 09/02/2020 10:14 AM to [employer] ([employer]/CONCHIS HALL) by [id number]. Read Back: Yes     PCO2 34 - 46 mm HG 90.4High Panic   Comment: *CRITICAL VALUE*   Successful Call: PCO2,PH called 09/02/2020 10:14 AM to [employer] ([employer]/ISABELCONCHIS) by [id number].  Read Back: Yes     PO2 90 - 110 mm HG 114High     BICARBONATE 21 - 29 mmol/L 34.3High     BASE EXCESS 0 - 2 mmol/L 3High     O2 SAT 92 - 96 % 97High   Comment: Tested at [address]            ABG done with BiPAP ST        Ref Range & Units 7d ago    PH 7.35 - 7.45 7.371     PCO2 34 - 46 mm HG 53.4High      PO2 90 - 110 mm HG 99     BICARBONATE 21 - 29 mmol/L 30.2High      BASE EXCESS 0 - 2 mmol/L 4High      O2 SAT 92 - 96 % 97High    Comment: Tested at [address] [id number]        ABG after AVAPS started        Ref Range & Units 6d ago    PH 7.35 - 7.45 7.445     PCO2 34 - 46 mm HG 51.6High      PO2 90 - 110 mm HG 90     BICARBONATE 21 - 29 mmol/L 34.7High      BASE EXCESS 0 - 2 mmol/L 9High      O2 SAT 92 - 96 % 97High Comment: Tested at [address] [place] [address] 40% 97%                     Vj Grajeda MD        Please note that some or all of this record was generated using voice recognition software. If there are any questions about the content of this document, please contact the author as some errors of transcription may have occurred. Patient is tolerating noninvasive mechanical ventilation  No issues    Occasionally having some ear popping but this is very rarely    No bloating  No burping  No chest pains  No headaches  Tolerating the pressure  No nausea or vomiting  No ear popping    Going and sleeping well with the machine       Since last visit   Overall doing well  Still with some issues of dry mouth in am  Using oxygen in am              COPD      Review of Systems   Constitutional: Negative. HENT: Negative. Eyes: Negative. Respiratory: Negative. Cardiovascular: Negative. Gastrointestinal: Negative. Endocrine: Negative. Genitourinary: Negative. Musculoskeletal: Negative. Skin: Negative. Allergic/Immunologic: Negative. Neurological: Negative. Hematological: Negative. Psychiatric/Behavioral: Negative. Vitals:    09/01/22 1420   BP: 121/80   Site: Left Lower Arm   Position: Sitting   Cuff Size: Medium Adult   Pulse: 64   Resp: 16   Temp: 97.1 °F (36.2 °C)   TempSrc: Temporal   SpO2: 94%   Height: 5' 3\" (1.6 m)       Objective   Physical Exam  Vitals and nursing note reviewed. Constitutional:       General: She is not in acute distress. Appearance: Normal appearance. She is not ill-appearing. HENT:      Head: Normocephalic and atraumatic. Right Ear: External ear normal.      Left Ear: External ear normal.      Nose: Nose normal.      Mouth/Throat:      Mouth: Mucous membranes are moist.      Pharynx: Oropharynx is clear. Comments: Mallampati 3  Eyes:      General: No scleral icterus.      Extraocular Movements: Extraocular movements intact. Conjunctiva/sclera: Conjunctivae normal.      Pupils: Pupils are equal, round, and reactive to light. Cardiovascular:      Rate and Rhythm: Normal rate and regular rhythm. Pulses: Normal pulses. Heart sounds: Normal heart sounds. No murmur heard. No friction rub. Pulmonary:      Effort: No respiratory distress. Breath sounds: No stridor. No wheezing, rhonchi or rales. Chest:      Chest wall: No tenderness. Abdominal:      General: Abdomen is flat. Bowel sounds are normal. There is no distension. Tenderness: There is no abdominal tenderness. There is no guarding. Musculoskeletal:         General: Swelling present. No tenderness. Normal range of motion. Cervical back: Normal range of motion and neck supple. No rigidity. Skin:     General: Skin is warm and dry. Coloration: Skin is not jaundiced. Neurological:      General: No focal deficit present. Mental Status: She is alert and oriented to person, place, and time. Mental status is at baseline. Cranial Nerves: No cranial nerve deficit. Sensory: No sensory deficit. Motor: No weakness. Gait: Gait normal.   Psychiatric:         Mood and Affect: Mood normal.         Thought Content: Thought content normal.         Judgment: Judgment normal.          On this date 8/3/2021 I have spent 35 minutes reviewing previous notes, test results and face to face with the patient discussing the diagnosis and importance of compliance with the treatment plan as well as documenting on the day of the visit.       An electronic signature was used to authenticate this note.    --Melissa Pizarro MD

## 2022-09-01 NOTE — PATIENT INSTRUCTIONS
ASSESSMENT/PLAN:  1. Chronic respiratory failure with hypercapnia (HCC)  2. Dependence on enabling machine  3. Chronic obstructive pulmonary disease, unspecified COPD type ([address]  4. Diastolic CHF, chronic ([address]  5. Sinusitis, unspecified chronicity, unspecified location    This was started in 9/2/2020    Patient will need noninvasive mechanical ventilation  She does not have any sleep apnea  PFTs remarkably are more consistent with an obstructive event some possibility of restrictive lung defect however will probably need a full PFT to determine this     Patient did not tolerate BiPAP and during the hospitalization and was worsened with increased CO2 level, also had BiPAP with ST and still had increased CO2 level     Ordering volume ventilator for nocturnal uses and daytime use as needed to reduce the risk of exacerbation. Bipap insufficient due to severity of condition. Absence of respiratory support will lead to death. Patient has COPD and Chronic Respiratory Failure. Patient will need targeted tidal volume which cannot be provided via a CPAP. Bipap/Bilevel will not adequately ventilate this patient in order to complete ADLs,   Bipap/Bilevel will not lower the patient's CO2 levels, and this patient will require a set tidal volume. Therefore Astral/Trilogy NIV is being ordered due to the severe state of this patient's disease. Without this therapy, the patient runs a higher risk of expiration. ABG done after being on bipap for 1 month and coming to the hospital       7.35 - 7.45 7.197Low Panic     Comment: *CRITICAL VALUE*   Successful Call: PCO2,PH called 09/02/2020 10:14 AM to [employer] ([employer]/ISABEL,CONCHIS) by [id number]. Read Back: Yes     PCO2 34 - 46 mm HG 90.4High Panic   Comment: *CRITICAL VALUE*   Successful Call: PCO2,PH called 09/02/2020 10:14 AM to [employer] ([employer]) by [phone].  Read Back: Yes     PO2 90 - 110 mm HG 114High     BICARBONATE 21 - 29 mmol/L 34.3High     BASE EXCESS 0 - 2 mmol/L 3High     O2 SAT 92 - 96 % 97High   Comment: Tested at [address]            ABG done with BiPAP ST      Ref Range & Units 7d ago     PH 7.35 - 7.45 7.371     PCO2 34 - 46 mm HG 53.4High       PO2 90 - 110 mm HG 99     BICARBONATE 21 - 29 mmol/L 30.2High       BASE EXCESS 0 - 2 mmol/L 4High       O2 SAT 92 - 96 % 97High     Comment: Tested at [address] [id number]         ABG after AVAPS started      Ref Range & Units 6d ago     PH 7.35 - 7.45 7.445     PCO2 34 - 46 mm HG 51.6High       PO2 90 - 110 mm HG 90     BICARBONATE 21 - 29 mmol/L 34.7High       BASE EXCESS 0 - 2 mmol/L 9High       O2 SAT 92 - 96 % 97High     Comment: Tested at [address] 40% 97%            Now at [facility]      dme is [facility]   Trilogy    AVAPS ST mode  avaps rate 1, tv of 550, ipap max 22 and min of 10, epap min 7 and max of 11, rate 20, ti 1.5, rise 5, trig autotrak senstive, max pressure 30    Download done  8/26/22 for 30 days  Average IPAP 24  Average EPAP 10.3  Average breaths per minute 20  Average tidal volume 277  Average minute ventilation 5.6  100% use  Using 7.2hours per night  Leak at 109 L/min      I had asked that the max pressure to 30 the last time  We will reorder it again today to have the max pressure set to 40, and the pressures were max to 30    Using nasal mask  Patient is instructed to contact me if the ear popping gets worse    Will send order for supplies to [facility]      Last blood work done 7/20/22  Showed HCO3 was 27      Sinusitis  Continue with   flonase 1spray/nostril twice a day        Has not been the hospital since 9/2020 since getting the trilogy    RTC in 3 months nonverbal cues (demo/gestures)/verbal cues/1 person assist

## 2022-09-20 ENCOUNTER — TELEPHONE (OUTPATIENT)
Dept: PULMONOLOGY | Age: 82
End: 2022-09-20

## 2022-09-20 NOTE — TELEPHONE ENCOUNTER
Demetrio Bates from [facility] they did not receive office notes from [place] last visit, could you please fax to [phone].  Thanks

## 2023-01-19 ENCOUNTER — TELEPHONE (OUTPATIENT)
Dept: PULMONOLOGY | Age: 83
End: 2023-01-19

## 2023-01-19 NOTE — TELEPHONE ENCOUNTER
Pt has appt balbina/Hilda on 2/6 that needs to be r/s with Dr. Viky Cameron because she is on an NIV. I can get her in on 2/9/23 @ 2315. I tried to call but it no one ever picked up. Will try again later.

## 2023-01-20 NOTE — TELEPHONE ENCOUNTER
Still unable to reach pt. Called son Almas Doyle and left VM to please have his mother call us re. Her appt.  On 2/8

## 2023-01-23 NOTE — TELEPHONE ENCOUNTER
I s/w Venus Mccrary at [facility] and rescheduled her with Dr. Felipe Brandt on 2/9/23.